# Patient Record
Sex: MALE | Race: WHITE | NOT HISPANIC OR LATINO | Employment: OTHER | URBAN - METROPOLITAN AREA
[De-identification: names, ages, dates, MRNs, and addresses within clinical notes are randomized per-mention and may not be internally consistent; named-entity substitution may affect disease eponyms.]

---

## 2023-04-12 DIAGNOSIS — N28.89 KIDNEY MASS: Primary | ICD-10-CM

## 2023-04-12 DIAGNOSIS — N28.89 LEFT RENAL MASS: Primary | ICD-10-CM

## 2023-05-17 ENCOUNTER — OFFICE VISIT (OUTPATIENT)
Dept: UROLOGY | Facility: CLINIC | Age: 53
End: 2023-05-17
Payer: MEDICAID

## 2023-05-17 VITALS
WEIGHT: 224.63 LBS | SYSTOLIC BLOOD PRESSURE: 136 MMHG | OXYGEN SATURATION: 100 % | HEART RATE: 91 BPM | TEMPERATURE: 98 F | DIASTOLIC BLOOD PRESSURE: 92 MMHG | RESPIRATION RATE: 20 BRPM

## 2023-05-17 DIAGNOSIS — N28.89 RENAL MASS, LEFT: Primary | ICD-10-CM

## 2023-05-17 DIAGNOSIS — N28.1 RENAL CYST: ICD-10-CM

## 2023-05-17 LAB
BILIRUB SERPL-MCNC: NORMAL MG/DL
BLOOD URINE, POC: NORMAL
COLOR, POC UA: YELLOW
GLUCOSE UR QL STRIP: NORMAL
KETONES UR QL STRIP: NORMAL
LEUKOCYTE ESTERASE URINE, POC: NORMAL
NITRITE, POC UA: NORMAL
PH, POC UA: 7
PROTEIN, POC: NORMAL
SPECIFIC GRAVITY, POC UA: 1.02
UROBILINOGEN, POC UA: 1

## 2023-05-17 PROCEDURE — 3080F DIAST BP >= 90 MM HG: CPT | Mod: CPTII,,, | Performed by: UROLOGY

## 2023-05-17 PROCEDURE — 81001 URINALYSIS AUTO W/SCOPE: CPT | Mod: PBBFAC | Performed by: UROLOGY

## 2023-05-17 PROCEDURE — 99204 PR OFFICE/OUTPT VISIT, NEW, LEVL IV, 45-59 MIN: ICD-10-PCS | Mod: S$PBB,,, | Performed by: UROLOGY

## 2023-05-17 PROCEDURE — 99214 OFFICE O/P EST MOD 30 MIN: CPT | Mod: PBBFAC | Performed by: UROLOGY

## 2023-05-17 PROCEDURE — 3075F SYST BP GE 130 - 139MM HG: CPT | Mod: CPTII,,, | Performed by: UROLOGY

## 2023-05-17 PROCEDURE — 1159F MED LIST DOCD IN RCRD: CPT | Mod: CPTII,,, | Performed by: UROLOGY

## 2023-05-17 PROCEDURE — 1160F RVW MEDS BY RX/DR IN RCRD: CPT | Mod: CPTII,,, | Performed by: UROLOGY

## 2023-05-17 PROCEDURE — 3075F PR MOST RECENT SYSTOLIC BLOOD PRESS GE 130-139MM HG: ICD-10-PCS | Mod: CPTII,,, | Performed by: UROLOGY

## 2023-05-17 PROCEDURE — 1160F PR REVIEW ALL MEDS BY PRESCRIBER/CLIN PHARMACIST DOCUMENTED: ICD-10-PCS | Mod: CPTII,,, | Performed by: UROLOGY

## 2023-05-17 PROCEDURE — 3080F PR MOST RECENT DIASTOLIC BLOOD PRESSURE >= 90 MM HG: ICD-10-PCS | Mod: CPTII,,, | Performed by: UROLOGY

## 2023-05-17 PROCEDURE — 99204 OFFICE O/P NEW MOD 45 MIN: CPT | Mod: S$PBB,,, | Performed by: UROLOGY

## 2023-05-17 PROCEDURE — 1159F PR MEDICATION LIST DOCUMENTED IN MEDICAL RECORD: ICD-10-PCS | Mod: CPTII,,, | Performed by: UROLOGY

## 2023-05-17 RX ORDER — ASPIRIN 325 MG
50000 TABLET, DELAYED RELEASE (ENTERIC COATED) ORAL
COMMUNITY
Start: 2023-05-03

## 2023-05-17 RX ORDER — FAMOTIDINE 20 MG/1
20 TABLET, FILM COATED ORAL 2 TIMES DAILY
COMMUNITY
Start: 2023-05-08

## 2023-05-17 RX ORDER — LOSARTAN POTASSIUM AND HYDROCHLOROTHIAZIDE 12.5; 5 MG/1; MG/1
1 TABLET ORAL
COMMUNITY
Start: 2023-05-08

## 2023-05-17 RX ORDER — EPINEPHRINE 0.3 MG/.3ML
INJECTION SUBCUTANEOUS
COMMUNITY
Start: 2023-05-11

## 2023-05-17 RX ORDER — PEGVALIASE-PQPZ 2.5 MG/.5ML
INJECTION, SOLUTION SUBCUTANEOUS
COMMUNITY
Start: 2023-05-10

## 2023-05-17 NOTE — PROGRESS NOTES
CC:  Renal mass    HPI:  Michael Anand is a 52 y.o. male being seen in consultation for renal mass.  He had some blood work which showed some hepatic dysfunction so an ultrasound was obtained to evaluate the liver.  The ultrasound done on 3 March 2023 showed an incidental left renal cyst and a questionable 4 cm mass in the left kidney.  The right kidney was unremarkable.  A CT scan was done on 31 March 2023 which showed a solid heterogenous enhancing left renal mass in the midportion of the left kidney measuring 4.1 cm.  Additionally there is a cystic inferior pole left renal mass measuring 5.5 cm with thick walls and mural enhancement.  This mass is exophytic and extends into the perinephric fat anteriorly.  There are additional hypodensities measuring one cm in diameter too small to characterize.  Similar subcentimeter mid right renal hypodensities are also too small to characterize.    Lab Results:    PSA - 14 February 2023:  0.830    Urinalysis today:    Color, UA Yellow    Spec Grav UA 1.025    pH, UA 7.0    WBC, UA neg    Nitrite, UA neg    Protein, POC neg    Glucose, UA neg    Ketones, UA neg    Urobilinogen, UA 1.0    Bilirubin, POC neg    Blood, UA neg         Microscopic:  Negative      Specimen Collected: 05/17/23 14:22           Imaging:  See HPI.  No that      Data Review:  Note from referring provider, Claude Meeks, MD dated 11 April 2023.  Ultrasound.  CT.  PSA.      ROS:  All systems reviewed and are negative except as documented in HPI and/or Assessment and Plan.     Patient Active Problem List:     There is no problem list on file for this patient.       Past Medical History:  History reviewed. No pertinent past medical history.     Past Surgical History:  History reviewed. No pertinent surgical history.     Family History:  History reviewed. No pertinent family history.     Social History:  Social History     Socioeconomic History    Marital status: Single   Tobacco Use    Smoking status:  Every Day     Types: Cigarettes    Smokeless tobacco: Former        Allergies:  Review of patient's allergies indicates:   Allergen Reactions    Codeine Anaphylaxis        Objective:  Vitals:    05/17/23 1418   BP: (!) 136/92   Pulse:    Resp:    Temp:      General:  Well developed, well nourished adult male in no acute distress  Abdomen: Soft, nontender, no masses  Extremities:  No clubbing, cyanosis, or edema  Neurologic:  Grossly intact  Musculoskeletal:  Normal tone    Assessment:  1. Renal mass, left  - Ambulatory referral/consult to Urology  - POCT URINE DIPSTICK WITH MICROSCOPE, AUTOMATED  - Ambulatory referral/consult to Urology; Future    2. Renal cyst     Plan:  Needs referral to [place] for definitive management of the solid left renal mass.  The cysts in the right kidney appear to be likely just cysts but are too small to characterize.  These will be followed in the future with CT scans.    Follow-up:  He is scheduled for follow-up here in four months but if that needs to be moved sooner because of the surgery in [place] we will do that.

## 2023-08-11 ENCOUNTER — OFFICE VISIT (OUTPATIENT)
Dept: UROLOGY | Facility: CLINIC | Age: 53
End: 2023-08-11
Payer: MEDICAID

## 2023-08-11 VITALS
RESPIRATION RATE: 18 BRPM | WEIGHT: 217 LBS | HEIGHT: 70 IN | HEART RATE: 96 BPM | OXYGEN SATURATION: 99 % | TEMPERATURE: 98 F | SYSTOLIC BLOOD PRESSURE: 130 MMHG | BODY MASS INDEX: 31.07 KG/M2 | DIASTOLIC BLOOD PRESSURE: 90 MMHG

## 2023-08-11 DIAGNOSIS — C64.2 CLEAR CELL RENAL CELL CARCINOMA, LEFT: Primary | ICD-10-CM

## 2023-08-11 LAB
BILIRUB SERPL-MCNC: NEGATIVE MG/DL
BLOOD URINE, POC: NORMAL
COLOR, POC UA: NORMAL
GLUCOSE UR QL STRIP: NEGATIVE
KETONES UR QL STRIP: NORMAL
LEUKOCYTE ESTERASE URINE, POC: NEGATIVE
NITRITE, POC UA: NEGATIVE
PH, POC UA: 6
PROTEIN, POC: 30
SPECIFIC GRAVITY, POC UA: 1.03
UROBILINOGEN, POC UA: 1

## 2023-08-11 PROCEDURE — 3080F DIAST BP >= 90 MM HG: CPT | Mod: CPTII,,, | Performed by: UROLOGY

## 2023-08-11 PROCEDURE — 81001 URINALYSIS AUTO W/SCOPE: CPT | Mod: PBBFAC | Performed by: UROLOGY

## 2023-08-11 PROCEDURE — 99215 OFFICE O/P EST HI 40 MIN: CPT | Mod: PBBFAC | Performed by: UROLOGY

## 2023-08-11 PROCEDURE — 1160F RVW MEDS BY RX/DR IN RCRD: CPT | Mod: CPTII,,, | Performed by: UROLOGY

## 2023-08-11 PROCEDURE — 1160F PR REVIEW ALL MEDS BY PRESCRIBER/CLIN PHARMACIST DOCUMENTED: ICD-10-PCS | Mod: CPTII,,, | Performed by: UROLOGY

## 2023-08-11 PROCEDURE — 1159F PR MEDICATION LIST DOCUMENTED IN MEDICAL RECORD: ICD-10-PCS | Mod: CPTII,,, | Performed by: UROLOGY

## 2023-08-11 PROCEDURE — 1159F MED LIST DOCD IN RCRD: CPT | Mod: CPTII,,, | Performed by: UROLOGY

## 2023-08-11 PROCEDURE — 3080F PR MOST RECENT DIASTOLIC BLOOD PRESSURE >= 90 MM HG: ICD-10-PCS | Mod: CPTII,,, | Performed by: UROLOGY

## 2023-08-11 PROCEDURE — 99213 OFFICE O/P EST LOW 20 MIN: CPT | Mod: S$PBB,,, | Performed by: UROLOGY

## 2023-08-11 PROCEDURE — 3008F PR BODY MASS INDEX (BMI) DOCUMENTED: ICD-10-PCS | Mod: CPTII,,, | Performed by: UROLOGY

## 2023-08-11 PROCEDURE — 3008F BODY MASS INDEX DOCD: CPT | Mod: CPTII,,, | Performed by: UROLOGY

## 2023-08-11 PROCEDURE — 99213 PR OFFICE/OUTPT VISIT, EST, LEVL III, 20-29 MIN: ICD-10-PCS | Mod: S$PBB,,, | Performed by: UROLOGY

## 2023-08-11 PROCEDURE — 3075F SYST BP GE 130 - 139MM HG: CPT | Mod: CPTII,,, | Performed by: UROLOGY

## 2023-08-11 PROCEDURE — 3075F PR MOST RECENT SYSTOLIC BLOOD PRESS GE 130-139MM HG: ICD-10-PCS | Mod: CPTII,,, | Performed by: UROLOGY

## 2023-08-11 NOTE — PROGRESS NOTES
CC:  Postoperative follow-up    HPI:  Michael Anand is a 52 y.o. male seen for follow-up after nephrectomy.  He had a robotic assisted radical nephrectomy on the left on 25 July 2023 at [facility].  He stayed overnight and was discharged the next day.  He had no complications.  The preoperative chest x-ray did show a question of something in the lungs so he had a chest CT which did not show any pathology.  The pathology report for the nephrectomy showed a clear cell renal cell carcinoma measuring 5.3 cm.  It was G2 with no lymphovascular invasion and negative margins.    Urinalysis:  Results for orders placed or performed in visit on 08/11/23   POCT URINE DIPSTICK WITH MICROSCOPE, AUTOMATED   Result Value Ref Range    Color, UA Orange     Spec Grav UA 1.030     pH, UA 6.0     WBC, UA Negative     Nitrite, UA Negative     Protein, POC 30     Glucose, UA Negative     Ketones, UA Trace     Urobilinogen, UA 1.0     Bilirubin, POC Negative     Blood, UA Neagative      Microscopic:  Negative      Data Review:  Operative note from [facility] for nephrectomy on 25 July 2023.  CT scan.  Pathology report.    ROS:  All systems reviewed and are negative except as documented in HPI and/or Assessment and Plan.     Patient Active Problem List:     There is no problem list on file for this patient.       Past Medical History:  History reviewed. No pertinent past medical history.     Past Surgical History:  History reviewed. No pertinent surgical history.     Family History:  History reviewed. No pertinent family history.     Social History:  Social History     Socioeconomic History    Marital status: Single   Tobacco Use    Smoking status: Every Day     Current packs/day: 0.00     Types: Cigarettes    Smokeless tobacco: Former        Allergies:  Review of patient's allergies indicates:   Allergen Reactions    Codeine Anaphylaxis        Objective:  Vitals:    08/11/23 1036   BP: (!) 130/90   Pulse: 96   Resp: 18   Temp: 97.9 °F (36.6 °C)      General:  Well developed, well nourished adult male in no acute distress  Abdomen:  The incisions are healing well without any signs of infection.  Steri-Strips are still in place.  Extremities:  No clubbing, cyanosis, or edema  Neurologic:  Grossly intact  Musculoskeletal:  Normal tone    Assessment:  1. Clear cell renal cell carcinoma, left  - POCT URINE DIPSTICK WITH MICROSCOPE, AUTOMATED  - CT Abdomen Pelvis W Wo Contrast; Future  - Basic Metabolic Panel; Future  - CBC Auto Differential; Future  - X-Ray Chest PA And Lateral; Future     Plan:  CT scan, chest x-ray, CBC, and BMP are ordered for three months from now.  If those are normal we will go to every six months follow-up.  I can not find any record of him having had a PSA so we will address this at his next visit.    Follow-up:  Three months after above studies.

## 2023-08-11 NOTE — PROGRESS NOTES
Pt seen by Dr. Tao. Pt will return to clinic in 3 months. Orders for CT and lab orders given to patient to be done prior to appt. Pt education given both written and verbal.

## 2023-12-15 ENCOUNTER — HOSPITAL ENCOUNTER (OUTPATIENT)
Dept: RADIOLOGY | Facility: HOSPITAL | Age: 53
Discharge: HOME | End: 2023-12-15
Attending: UROLOGY
Payer: MEDICAID

## 2023-12-15 DIAGNOSIS — C64.2 CLEAR CELL RENAL CELL CARCINOMA, LEFT: ICD-10-CM

## 2023-12-15 LAB
CREAT SERPL-MCNC: 1.22 MG/DL (ref 0.73–1.18)
GFR SERPLBLD CREATININE-BSD FMLA CKD-EPI: >60 MLS/MIN/1.73/M2

## 2023-12-15 PROCEDURE — 25500020 PHARM REV CODE 255

## 2023-12-15 PROCEDURE — 74178 CT ABD&PLV WO CNTR FLWD CNTR: CPT | Mod: TC

## 2023-12-15 PROCEDURE — 82565 ASSAY OF CREATININE: CPT | Performed by: UROLOGY

## 2023-12-15 RX ADMIN — IOHEXOL 100 ML: 350 INJECTION, SOLUTION INTRAVENOUS at 10:12

## 2024-01-11 ENCOUNTER — HOSPITAL ENCOUNTER (OUTPATIENT)
Dept: RADIOLOGY | Facility: HOSPITAL | Age: 54
Discharge: HOME | End: 2024-01-11
Attending: UROLOGY
Payer: MEDICAID

## 2024-01-11 DIAGNOSIS — C64.2 CLEAR CELL RENAL CELL CARCINOMA, LEFT: ICD-10-CM

## 2024-01-11 PROCEDURE — 71046 X-RAY EXAM CHEST 2 VIEWS: CPT | Mod: TC

## 2024-01-12 ENCOUNTER — OFFICE VISIT (OUTPATIENT)
Dept: UROLOGY | Facility: CLINIC | Age: 54
End: 2024-01-12
Payer: MEDICAID

## 2024-01-12 VITALS
WEIGHT: 240.19 LBS | RESPIRATION RATE: 20 BRPM | DIASTOLIC BLOOD PRESSURE: 88 MMHG | BODY MASS INDEX: 34.39 KG/M2 | TEMPERATURE: 98 F | SYSTOLIC BLOOD PRESSURE: 125 MMHG | OXYGEN SATURATION: 97 % | HEIGHT: 70 IN | HEART RATE: 72 BPM

## 2024-01-12 DIAGNOSIS — Z90.5 HISTORY OF LEFT NEPHRECTOMY: ICD-10-CM

## 2024-01-12 DIAGNOSIS — C64.2 CLEAR CELL RENAL CELL CARCINOMA, LEFT: Primary | ICD-10-CM

## 2024-01-12 LAB
BILIRUB SERPL-MCNC: NORMAL MG/DL
BLOOD URINE, POC: NORMAL
COLOR, POC UA: YELLOW
GLUCOSE UR QL STRIP: NORMAL
KETONES UR QL STRIP: NORMAL
LEUKOCYTE ESTERASE URINE, POC: NORMAL
NITRITE, POC UA: NORMAL
PH, POC UA: 7
PROTEIN, POC: NORMAL
SPECIFIC GRAVITY, POC UA: 1.02
UROBILINOGEN, POC UA: 0.2

## 2024-01-12 PROCEDURE — 99214 OFFICE O/P EST MOD 30 MIN: CPT | Mod: PBBFAC | Performed by: UROLOGY

## 2024-01-12 PROCEDURE — 3074F SYST BP LT 130 MM HG: CPT | Mod: CPTII,,, | Performed by: UROLOGY

## 2024-01-12 PROCEDURE — 3008F BODY MASS INDEX DOCD: CPT | Mod: CPTII,,, | Performed by: UROLOGY

## 2024-01-12 PROCEDURE — 1160F RVW MEDS BY RX/DR IN RCRD: CPT | Mod: CPTII,,, | Performed by: UROLOGY

## 2024-01-12 PROCEDURE — 3079F DIAST BP 80-89 MM HG: CPT | Mod: CPTII,,, | Performed by: UROLOGY

## 2024-01-12 PROCEDURE — 99214 OFFICE O/P EST MOD 30 MIN: CPT | Mod: S$PBB,,, | Performed by: UROLOGY

## 2024-01-12 PROCEDURE — 1159F MED LIST DOCD IN RCRD: CPT | Mod: CPTII,,, | Performed by: UROLOGY

## 2024-01-12 PROCEDURE — 81001 URINALYSIS AUTO W/SCOPE: CPT | Mod: PBBFAC | Performed by: UROLOGY

## 2024-01-12 NOTE — PROGRESS NOTES
CC:  Imaging results    HPI:  Michael Anand is a 53 y.o. male seen for follow-up of imaging done for follow-up of renal cell carcinoma.  He had a robotic assisted radical nephrectomy on the left on 25 July 2023 at [facility].  The pathology report for the nephrectomy showed a clear cell renal cell carcinoma measuring 5.3 cm.  It was G2 with no lymphovascular invasion and negative margins.  He has no complaints today.    Urinalysis:  Results for orders placed or performed in visit on 01/12/24   POCT URINE DIPSTICK WITH MICROSCOPE, AUTOMATED   Result Value Ref Range    Color, UA Yellow     Spec Grav UA 1.020     pH, UA 7.0     WBC, UA neg     Nitrite, UA neg     Protein, POC neg     Glucose, UA neg     Ketones, UA neg     Urobilinogen, UA 0.2     Bilirubin, POC neg     Blood, UA neg      Microscopic:  Negative      Lab Results:  PSA History:     Latest Reference Range & Units 6/22/2023   Prostate Specific Antigen <=4.00 ng/mL 0.9     CBC Latest Reference Range & Units 01/11/24 08:39   WBC 4.50 - 11.50 x10(3)/mcL 6.68   RBC 4.70 - 6.10 x10(6)/mcL 5.33   Hemoglobin 14.0 - 18.0 g/dL 16.1   Hematocrit 42.0 - 52.0 % 47.0   MCV 80.0 - 94.0 fL 88.2   MCH 27.0 - 31.0 pg 30.2   MCHC 33.0 - 36.0 g/dL 34.3   RDW 11.5 - 17.0 % 12.2   Platelet Count 130 - 400 x10(3)/mcL 276     BMP Latest Reference Range & Units 01/11/24 08:39   Sodium 136 - 145 mmol/L 139   Potassium 3.5 - 5.1 mmol/L 4.3   Chloride 98 - 107 mmol/L 107   CO2 22 - 29 mmol/L 27   Anion Gap mEq/L 5.0   BUN 8.4 - 25.7 mg/dL 19.3   Creatinine 0.73 - 1.18 mg/dL 1.33 (H)   BUN/CREAT RATIO  15   eGFR mls/min/1.73/m2 >60   Glucose 74 - 100 mg/dL 86   Calcium 8.4 - 10.2 mg/dL 10.0     Imaging:  Chest x-ray - 11 January 2024:  Negative    CT - 15 December 2023:  There is a soft tissue density in the left nephrectomy bed.  It measures 4 cm x 2.3 cm.  There is soft tissue and fat density within this area.  This could represent an area of scarring from the recent nephrectomy but  I cannot rule out residual or recurrent lesion.   There is another area of abnormality seen in the left lower quadrant in the pelvis.  The area has soft tissue and fat density with some associated inflammatory changes.  No enhancement is seen.  The area extends from the left lower quadrant to the left distal ureteral remnant.  This may represent area of scarring but the due to its close proximity to the left distal ureteral remnant underlying malignancy cannot be completely excluded.  Short-term follow-up is recommended.     Data Review:  PSA    ROS:  All systems reviewed and are negative except as documented in HPI and/or Assessment and Plan.     Patient Active Problem List:     There is no problem list on file for this patient.       Past Medical History:  History reviewed. No pertinent past medical history.     Past Surgical History:  History reviewed. No pertinent surgical history.     Family History:  History reviewed. No pertinent family history.     Social History:  Social History     Socioeconomic History    Marital status: Single   Tobacco Use    Smoking status: Every Day     Types: Cigarettes    Smokeless tobacco: Former        Allergies:  Review of patient's allergies indicates:   Allergen Reactions    Codeine Anaphylaxis        Objective:  Vitals:    01/12/24 1031   BP: 125/88   Pulse: 72   Resp: 20   Temp: 98.2 °F (36.8 °C)     General:  Well developed, well nourished adult male in no acute distress  Abdomen: Soft, nontender, no masses  Extremities:  No clubbing, cyanosis, or edema  Neurologic:  Grossly intact  Musculoskeletal:  Normal tone    Assessment:  1. Clear cell renal cell carcinoma, left  - POCT URINE DIPSTICK WITH MICROSCOPE, AUTOMATED  - Basic Metabolic Panel; Future  - CT Abdomen Pelvis W Wo Contrast; Future    2. History of left nephrectomy     Plan:  With the findings listed above in the CT scan he will need closer follow-up then was initially planned.  We will repeat the CT scan in three  months.  He has had a slight bump in the creatinine so will also get a BMP that time.  As above.  He will need a PSA and WINTER in June of 2024.    Follow-up:  CT scan in three months and follow-up after.

## 2024-04-01 ENCOUNTER — HOSPITAL ENCOUNTER (OUTPATIENT)
Dept: RADIOLOGY | Facility: HOSPITAL | Age: 54
Discharge: HOME | End: 2024-04-01
Attending: UROLOGY
Payer: MEDICAID

## 2024-04-01 DIAGNOSIS — C64.2 CLEAR CELL RENAL CELL CARCINOMA, LEFT: ICD-10-CM

## 2024-04-01 LAB
CREAT SERPL-MCNC: 1.17 MG/DL (ref 0.73–1.18)
GFR SERPLBLD CREATININE-BSD FMLA CKD-EPI: >60 MLS/MIN/1.73/M2

## 2024-04-01 PROCEDURE — 25500020 PHARM REV CODE 255: Performed by: UROLOGY

## 2024-04-01 PROCEDURE — 82565 ASSAY OF CREATININE: CPT | Performed by: UROLOGY

## 2024-04-01 PROCEDURE — 74178 CT ABD&PLV WO CNTR FLWD CNTR: CPT | Mod: TC

## 2024-04-01 RX ADMIN — IOHEXOL 100 ML: 350 INJECTION, SOLUTION INTRAVENOUS at 11:04
